# Patient Record
Sex: MALE | Race: WHITE
[De-identification: names, ages, dates, MRNs, and addresses within clinical notes are randomized per-mention and may not be internally consistent; named-entity substitution may affect disease eponyms.]

---

## 2018-01-01 ENCOUNTER — HOSPITAL ENCOUNTER (EMERGENCY)
Dept: HOSPITAL 95 - ER | Age: 38
Discharge: HOME | End: 2018-01-01
Payer: COMMERCIAL

## 2018-01-01 VITALS — BODY MASS INDEX: 36.1 KG/M2 | HEIGHT: 67 IN | WEIGHT: 230.01 LBS

## 2018-01-01 DIAGNOSIS — R07.9: Primary | ICD-10-CM

## 2018-01-01 DIAGNOSIS — Z79.899: ICD-10-CM

## 2018-01-01 DIAGNOSIS — F17.200: ICD-10-CM

## 2018-01-01 LAB
ALBUMIN SERPL BCP-MCNC: 3.6 G/DL (ref 3.4–5)
ALBUMIN/GLOB SERPL: 1.1 {RATIO} (ref 0.8–1.8)
ALT SERPL W P-5'-P-CCNC: 62 U/L (ref 12–78)
ANION GAP SERPL CALCULATED.4IONS-SCNC: 9 MMOL/L (ref 6–16)
AST SERPL W P-5'-P-CCNC: 29 U/L (ref 12–37)
BASOPHILS # BLD AUTO: 0.02 K/MM3 (ref 0–0.23)
BASOPHILS NFR BLD AUTO: 0 % (ref 0–2)
BILIRUB SERPL-MCNC: 0.4 MG/DL (ref 0.1–1)
BUN SERPL-MCNC: 10 MG/DL (ref 8–24)
CALCIUM SERPL-MCNC: 8 MG/DL (ref 8.5–10.1)
CHLORIDE SERPL-SCNC: 107 MMOL/L (ref 98–108)
CO2 SERPL-SCNC: 23 MMOL/L (ref 21–32)
CREAT SERPL-MCNC: 0.95 MG/DL (ref 0.6–1.2)
DEPRECATED RDW RBC AUTO: 40.1 FL (ref 35.1–46.3)
EOSINOPHIL # BLD AUTO: 0.33 K/MM3 (ref 0–0.68)
EOSINOPHIL NFR BLD AUTO: 6 % (ref 0–6)
ERYTHROCYTE [DISTWIDTH] IN BLOOD BY AUTOMATED COUNT: 13.4 % (ref 11.7–14.2)
GLOBULIN SER CALC-MCNC: 3.4 G/DL (ref 2.2–4)
GLUCOSE SERPL-MCNC: 136 MG/DL (ref 70–99)
HCT VFR BLD AUTO: 45.4 % (ref 37–53)
HGB BLD-MCNC: 15.5 G/DL (ref 13.5–17.5)
IMM GRANULOCYTES # BLD AUTO: 0.01 K/MM3 (ref 0–0.1)
IMM GRANULOCYTES NFR BLD AUTO: 0 % (ref 0–1)
LYMPHOCYTES # BLD AUTO: 2.27 K/MM3 (ref 0.84–5.2)
LYMPHOCYTES NFR BLD AUTO: 39 % (ref 21–46)
MCHC RBC AUTO-ENTMCNC: 34.1 G/DL (ref 31.5–36.5)
MCV RBC AUTO: 84 FL (ref 80–100)
MONOCYTES # BLD AUTO: 0.4 K/MM3 (ref 0.16–1.47)
MONOCYTES NFR BLD AUTO: 7 % (ref 4–13)
NEUTROPHILS # BLD AUTO: 2.85 K/MM3 (ref 1.96–9.15)
NEUTROPHILS NFR BLD AUTO: 49 % (ref 41–73)
NRBC # BLD AUTO: 0 K/MM3 (ref 0–0.02)
NRBC BLD AUTO-RTO: 0 /100 WBC (ref 0–0.2)
PLATELET # BLD AUTO: 174 K/MM3 (ref 150–400)
POTASSIUM SERPL-SCNC: 4 MMOL/L (ref 3.5–5.5)
PROT SERPL-MCNC: 7 G/DL (ref 6.4–8.2)
SODIUM SERPL-SCNC: 139 MMOL/L (ref 136–145)
TROPONIN I SERPL-MCNC: <0.015 NG/ML (ref 0–0.04)

## 2018-01-07 ENCOUNTER — HOSPITAL ENCOUNTER (EMERGENCY)
Dept: HOSPITAL 95 - ER | Age: 38
Discharge: HOME | End: 2018-01-07
Payer: COMMERCIAL

## 2018-01-07 VITALS — HEIGHT: 67 IN | WEIGHT: 230.01 LBS | BODY MASS INDEX: 36.1 KG/M2

## 2018-01-07 DIAGNOSIS — K62.5: Primary | ICD-10-CM

## 2018-01-07 DIAGNOSIS — I10: ICD-10-CM

## 2018-01-07 DIAGNOSIS — K76.0: ICD-10-CM

## 2018-01-07 DIAGNOSIS — F17.200: ICD-10-CM

## 2018-01-07 DIAGNOSIS — K57.10: ICD-10-CM

## 2018-01-07 DIAGNOSIS — Z79.899: ICD-10-CM

## 2018-01-07 LAB
ALBUMIN SERPL BCP-MCNC: 3.9 G/DL (ref 3.4–5)
ALBUMIN/GLOB SERPL: 1.2 {RATIO} (ref 0.8–1.8)
ALT SERPL W P-5'-P-CCNC: 59 U/L (ref 12–78)
ANION GAP SERPL CALCULATED.4IONS-SCNC: 5 MMOL/L (ref 6–16)
AST SERPL W P-5'-P-CCNC: 20 U/L (ref 12–37)
BASOPHILS # BLD AUTO: 0.01 K/MM3 (ref 0–0.23)
BASOPHILS NFR BLD AUTO: 0 % (ref 0–2)
BILIRUB SERPL-MCNC: 0.3 MG/DL (ref 0.1–1)
BUN SERPL-MCNC: 16 MG/DL (ref 8–24)
CALCIUM SERPL-MCNC: 8.4 MG/DL (ref 8.5–10.1)
CHLORIDE SERPL-SCNC: 105 MMOL/L (ref 98–108)
CO2 SERPL-SCNC: 28 MMOL/L (ref 21–32)
CREAT SERPL-MCNC: 0.95 MG/DL (ref 0.6–1.2)
DEPRECATED RDW RBC AUTO: 39.8 FL (ref 35.1–46.3)
EOSINOPHIL # BLD AUTO: 0.27 K/MM3 (ref 0–0.68)
EOSINOPHIL NFR BLD AUTO: 5 % (ref 0–6)
ERYTHROCYTE [DISTWIDTH] IN BLOOD BY AUTOMATED COUNT: 13.3 % (ref 11.7–14.2)
GLOBULIN SER CALC-MCNC: 3.2 G/DL (ref 2.2–4)
GLUCOSE SERPL-MCNC: 106 MG/DL (ref 70–99)
HCT VFR BLD AUTO: 47.8 % (ref 37–53)
HGB BLD-MCNC: 16.1 G/DL (ref 13.5–17.5)
IMM GRANULOCYTES # BLD AUTO: 0.01 K/MM3 (ref 0–0.1)
IMM GRANULOCYTES NFR BLD AUTO: 0 % (ref 0–1)
LYMPHOCYTES # BLD AUTO: 1.81 K/MM3 (ref 0.84–5.2)
LYMPHOCYTES NFR BLD AUTO: 36 % (ref 21–46)
MCHC RBC AUTO-ENTMCNC: 33.7 G/DL (ref 31.5–36.5)
MCV RBC AUTO: 83 FL (ref 80–100)
MONOCYTES # BLD AUTO: 0.34 K/MM3 (ref 0.16–1.47)
MONOCYTES NFR BLD AUTO: 7 % (ref 4–13)
NEUTROPHILS # BLD AUTO: 2.64 K/MM3 (ref 1.96–9.15)
NEUTROPHILS NFR BLD AUTO: 52 % (ref 41–73)
NRBC # BLD AUTO: 0 K/MM3 (ref 0–0.02)
NRBC BLD AUTO-RTO: 0 /100 WBC (ref 0–0.2)
PLATELET # BLD AUTO: 185 K/MM3 (ref 150–400)
POTASSIUM SERPL-SCNC: 4.1 MMOL/L (ref 3.5–5.5)
PROT SERPL-MCNC: 7.1 G/DL (ref 6.4–8.2)
SODIUM SERPL-SCNC: 138 MMOL/L (ref 136–145)

## 2018-02-09 ENCOUNTER — HOSPITAL ENCOUNTER (EMERGENCY)
Dept: HOSPITAL 95 - ER | Age: 38
LOS: 1 days | Discharge: HOME | End: 2018-02-10
Payer: COMMERCIAL

## 2018-02-09 VITALS — BODY MASS INDEX: 35.63 KG/M2 | HEIGHT: 67 IN | WEIGHT: 227.01 LBS

## 2018-02-09 DIAGNOSIS — R10.31: ICD-10-CM

## 2018-02-09 DIAGNOSIS — G89.18: Primary | ICD-10-CM

## 2018-02-09 DIAGNOSIS — R11.2: ICD-10-CM

## 2018-02-09 DIAGNOSIS — F17.210: ICD-10-CM

## 2018-02-09 LAB
ALBUMIN SERPL BCP-MCNC: 3.5 G/DL (ref 3.4–5)
ALBUMIN/GLOB SERPL: 1 {RATIO} (ref 0.8–1.8)
ALT SERPL W P-5'-P-CCNC: 53 U/L (ref 12–78)
ANION GAP SERPL CALCULATED.4IONS-SCNC: 8 MMOL/L (ref 6–16)
AST SERPL W P-5'-P-CCNC: 22 U/L (ref 12–37)
BASOPHILS # BLD AUTO: 0.03 K/MM3 (ref 0–0.23)
BASOPHILS NFR BLD AUTO: 0 % (ref 0–2)
BILIRUB SERPL-MCNC: 0.2 MG/DL (ref 0.1–1)
BUN SERPL-MCNC: 10 MG/DL (ref 8–24)
CALCIUM SERPL-MCNC: 8.4 MG/DL (ref 8.5–10.1)
CHLORIDE SERPL-SCNC: 107 MMOL/L (ref 98–108)
CO2 SERPL-SCNC: 26 MMOL/L (ref 21–32)
CREAT SERPL-MCNC: 0.96 MG/DL (ref 0.6–1.2)
DEPRECATED RDW RBC AUTO: 37.6 FL (ref 35.1–46.3)
EOSINOPHIL # BLD AUTO: 0.19 K/MM3 (ref 0–0.68)
EOSINOPHIL NFR BLD AUTO: 2 % (ref 0–6)
ERYTHROCYTE [DISTWIDTH] IN BLOOD BY AUTOMATED COUNT: 12.6 % (ref 11.7–14.2)
GLOBULIN SER CALC-MCNC: 3.6 G/DL (ref 2.2–4)
GLUCOSE SERPL-MCNC: 106 MG/DL (ref 70–99)
HCT VFR BLD AUTO: 43 % (ref 37–53)
HGB BLD-MCNC: 14.4 G/DL (ref 13.5–17.5)
IMM GRANULOCYTES # BLD AUTO: 0.03 K/MM3 (ref 0–0.1)
IMM GRANULOCYTES NFR BLD AUTO: 0 % (ref 0–1)
LYMPHOCYTES # BLD AUTO: 2.25 K/MM3 (ref 0.84–5.2)
LYMPHOCYTES NFR BLD AUTO: 25 % (ref 21–46)
MCHC RBC AUTO-ENTMCNC: 33.5 G/DL (ref 31.5–36.5)
MCV RBC AUTO: 82 FL (ref 80–100)
MONOCYTES # BLD AUTO: 0.49 K/MM3 (ref 0.16–1.47)
MONOCYTES NFR BLD AUTO: 6 % (ref 4–13)
NEUTROPHILS # BLD AUTO: 5.99 K/MM3 (ref 1.96–9.15)
NEUTROPHILS NFR BLD AUTO: 67 % (ref 41–73)
NRBC # BLD AUTO: 0 K/MM3 (ref 0–0.02)
NRBC BLD AUTO-RTO: 0 /100 WBC (ref 0–0.2)
PLATELET # BLD AUTO: 210 K/MM3 (ref 150–400)
POTASSIUM SERPL-SCNC: 3.5 MMOL/L (ref 3.5–5.5)
PROT SERPL-MCNC: 7.1 G/DL (ref 6.4–8.2)
SODIUM SERPL-SCNC: 141 MMOL/L (ref 136–145)

## 2018-02-27 ENCOUNTER — HOSPITAL ENCOUNTER (EMERGENCY)
Dept: HOSPITAL 95 - ER | Age: 38
Discharge: HOME | End: 2018-02-27
Payer: COMMERCIAL

## 2018-02-27 VITALS — HEIGHT: 67 IN | WEIGHT: 226 LBS | BODY MASS INDEX: 35.47 KG/M2

## 2018-02-27 DIAGNOSIS — R10.31: Primary | ICD-10-CM

## 2018-02-27 DIAGNOSIS — F17.210: ICD-10-CM

## 2018-02-27 DIAGNOSIS — K92.1: ICD-10-CM

## 2018-02-27 DIAGNOSIS — Z88.8: ICD-10-CM

## 2018-02-27 LAB
ALBUMIN SERPL BCP-MCNC: 3.8 G/DL (ref 3.4–5)
ALBUMIN/GLOB SERPL: 1.1 {RATIO} (ref 0.8–1.8)
ALT SERPL W P-5'-P-CCNC: 36 U/L (ref 12–78)
ANION GAP SERPL CALCULATED.4IONS-SCNC: 7 MMOL/L (ref 6–16)
AST SERPL W P-5'-P-CCNC: 23 U/L (ref 12–37)
BASOPHILS # BLD AUTO: 0.02 K/MM3 (ref 0–0.23)
BASOPHILS NFR BLD AUTO: 0 % (ref 0–2)
BILIRUB SERPL-MCNC: 0.5 MG/DL (ref 0.1–1)
BUN SERPL-MCNC: 12 MG/DL (ref 8–24)
CALCIUM SERPL-MCNC: 8.2 MG/DL (ref 8.5–10.1)
CHLORIDE SERPL-SCNC: 107 MMOL/L (ref 98–108)
CO2 SERPL-SCNC: 26 MMOL/L (ref 21–32)
CREAT SERPL-MCNC: 0.83 MG/DL (ref 0.6–1.2)
DEPRECATED RDW RBC AUTO: 43.2 FL (ref 35.1–46.3)
EOSINOPHIL # BLD AUTO: 0.22 K/MM3 (ref 0–0.68)
EOSINOPHIL NFR BLD AUTO: 4 % (ref 0–6)
ERYTHROCYTE [DISTWIDTH] IN BLOOD BY AUTOMATED COUNT: 14.2 % (ref 11.7–14.2)
GLOBULIN SER CALC-MCNC: 3.5 G/DL (ref 2.2–4)
GLUCOSE SERPL-MCNC: 115 MG/DL (ref 70–99)
HCT VFR BLD AUTO: 45.2 % (ref 37–53)
HGB BLD-MCNC: 14.7 G/DL (ref 13.5–17.5)
IMM GRANULOCYTES # BLD AUTO: 0.02 K/MM3 (ref 0–0.1)
IMM GRANULOCYTES NFR BLD AUTO: 0 % (ref 0–1)
LYMPHOCYTES # BLD AUTO: 1.59 K/MM3 (ref 0.84–5.2)
LYMPHOCYTES NFR BLD AUTO: 26 % (ref 21–46)
MCHC RBC AUTO-ENTMCNC: 32.5 G/DL (ref 31.5–36.5)
MCV RBC AUTO: 84 FL (ref 80–100)
MONOCYTES # BLD AUTO: 0.45 K/MM3 (ref 0.16–1.47)
MONOCYTES NFR BLD AUTO: 7 % (ref 4–13)
NEUTROPHILS # BLD AUTO: 3.86 K/MM3 (ref 1.96–9.15)
NEUTROPHILS NFR BLD AUTO: 63 % (ref 41–73)
NRBC # BLD AUTO: 0 K/MM3 (ref 0–0.02)
NRBC BLD AUTO-RTO: 0 /100 WBC (ref 0–0.2)
PLATELET # BLD AUTO: 170 K/MM3 (ref 150–400)
POTASSIUM SERPL-SCNC: 3.8 MMOL/L (ref 3.5–5.5)
PROT SERPL-MCNC: 7.3 G/DL (ref 6.4–8.2)
SODIUM SERPL-SCNC: 140 MMOL/L (ref 136–145)

## 2018-09-23 ENCOUNTER — HOSPITAL ENCOUNTER (EMERGENCY)
Dept: HOSPITAL 95 - ER | Age: 38
Discharge: HOME | End: 2018-09-23
Payer: COMMERCIAL

## 2018-09-23 VITALS — HEIGHT: 67 IN | WEIGHT: 170 LBS | BODY MASS INDEX: 26.68 KG/M2

## 2018-09-23 DIAGNOSIS — S00.93XA: ICD-10-CM
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
## 2018-09-23 DIAGNOSIS — Z88.8: ICD-10-CM

## 2018-09-23 DIAGNOSIS — S00.432A: Primary | ICD-10-CM

## 2018-09-23 DIAGNOSIS — F17.210: ICD-10-CM

## 2018-09-23 DIAGNOSIS — I10: ICD-10-CM

## 2019-02-12 ENCOUNTER — HOSPITAL ENCOUNTER (EMERGENCY)
Dept: HOSPITAL 94 - ER | Age: 39
Discharge: HOME | End: 2019-02-12
Payer: MEDICAID

## 2019-02-12 VITALS — WEIGHT: 146.28 LBS | HEIGHT: 67 IN | BODY MASS INDEX: 22.96 KG/M2

## 2019-02-12 VITALS — SYSTOLIC BLOOD PRESSURE: 141 MMHG | DIASTOLIC BLOOD PRESSURE: 83 MMHG

## 2019-02-12 DIAGNOSIS — K62.5: Primary | ICD-10-CM

## 2019-02-12 DIAGNOSIS — R10.33: ICD-10-CM

## 2019-02-12 LAB
ALBUMIN SERPL BCP-MCNC: 3.5 G/DL (ref 3.4–5)
ALBUMIN/GLOB SERPL: 0.9 {RATIO} (ref 1.1–1.5)
ALP SERPL-CCNC: 88 IU/L (ref 46–116)
ALT SERPL W P-5'-P-CCNC: 31 U/L (ref 12–78)
ANION GAP SERPL CALCULATED.3IONS-SCNC: 10 MMOL/L (ref 8–16)
AST SERPL W P-5'-P-CCNC: 21 U/L (ref 10–37)
BASOPHILS # BLD AUTO: 0.1 X10'3 (ref 0–0.2)
BASOPHILS NFR BLD AUTO: 1.3 % (ref 0–1)
BILIRUB SERPL-MCNC: 0.8 MG/DL (ref 0.1–1)
BUN SERPL-MCNC: 18 MG/DL (ref 7–18)
BUN/CREAT SERPL: 14 (ref 5.4–32)
CALCIUM SERPL-MCNC: 9.4 MG/DL (ref 8.5–10.1)
CHLORIDE SERPL-SCNC: 103 MMOL/L (ref 99–107)
CO2 SERPL-SCNC: 26.8 MMOL/L (ref 24–32)
CREAT SERPL-MCNC: 1.29 MG/DL (ref 0.6–1.1)
EOSINOPHIL # BLD AUTO: 0.2 X10'3 (ref 0–0.9)
EOSINOPHIL NFR BLD AUTO: 2.8 % (ref 0–6)
ERYTHROCYTE [DISTWIDTH] IN BLOOD BY AUTOMATED COUNT: 13.5 % (ref 11.5–14.5)
GFR SERPL CREATININE-BSD FRML MDRD: 62 ML/MIN
GLUCOSE SERPL-MCNC: 111 MG/DL (ref 70–104)
HCT VFR BLD AUTO: 48.6 % (ref 42–52)
HGB BLD-MCNC: 16.3 G/DL (ref 14–17.9)
INR PPP: 1 INR
LYMPHOCYTES # BLD AUTO: 1.1 X10'3 (ref 1.1–4.8)
LYMPHOCYTES NFR BLD AUTO: 14.4 % (ref 21–51)
MCH RBC QN AUTO: 27.3 PG (ref 27–31)
MCHC RBC AUTO-ENTMCNC: 33.5 G/DL (ref 33–36.5)
MCV RBC AUTO: 81.4 FL (ref 78–98)
MONOCYTES # BLD AUTO: 0.4 X10'3 (ref 0–0.9)
MONOCYTES NFR BLD AUTO: 5.7 % (ref 2–12)
NEUTROPHILS # BLD AUTO: 5.8 X10'3 (ref 1.8–7.7)
NEUTROPHILS NFR BLD AUTO: 75.8 % (ref 42–75)
PLATELET # BLD AUTO: 190 X10'3 (ref 140–440)
PMV BLD AUTO: 9.3 FL (ref 7.4–10.4)
POTASSIUM SERPL-SCNC: 4.5 MMOL/L (ref 3.5–5.1)
PROT SERPL-MCNC: 7.3 G/DL (ref 6.4–8.2)
PROTHROMBIN TIME: 9.8 SECONDS (ref 9–12)
RBC # BLD AUTO: 5.97 X10'6 (ref 4.7–6.1)
SODIUM SERPL-SCNC: 140 MMOL/L (ref 135–145)
WBC # BLD AUTO: 7.6 X10'3 (ref 4.5–11)

## 2019-02-12 PROCEDURE — 36415 COLL VENOUS BLD VENIPUNCTURE: CPT

## 2019-02-12 PROCEDURE — 80053 COMPREHEN METABOLIC PANEL: CPT

## 2019-02-12 PROCEDURE — 99284 EMERGENCY DEPT VISIT MOD MDM: CPT

## 2019-02-12 PROCEDURE — 85610 PROTHROMBIN TIME: CPT

## 2019-02-12 PROCEDURE — 85025 COMPLETE CBC W/AUTO DIFF WBC: CPT

## 2019-06-10 NOTE — NUR
NOTE
 
PT RESTING QUIETLY. ST. RATE 105-118 BPM. CONTINIOUS BIOX 94% OR HIGHER. PT
GIVEN ORAL BP MEDICATIONS PER DR LAKHANI DIRECTION. PT HAS BEEN ORIENTED BUT
CIWA 8-14. ACCORDING TO THE PT HIS WITHDRAWELS AEN'T DISORIENTION BUT
EXTREME RAGE AND PHYSICAL VIOLENCE. PT ASKED FOR A LOARGER ATIVAN DOSE. CALLED
DR DAMICO WHO GAVE ORDER FOR LARGER DOSE. ATIVAN 2MG GIVEN. PT SLEEPING.
EASILY AROUSABLE. BED ALARM ON. CONTINUE POT.

## 2019-06-10 NOTE — NUR
Shift Summary
No acute changes this shift, pt remains hypertensive, sleeping througout
this shift. NO events on tele. Pt in no apparent sign of distress.
Breathing easy and unlabored. Pt remains with wheezes throughout. Pt on
RA with o2 saturations >90%. Pt does not use call light, needs
redirection frequently, is pleasant. Pt uses urinal at bedside
independantly. Pt is able to make needs known, denies chest pain or
pressure, denies SOB. Bed in lowest and locked position, remains with
bed alarm in place for pt safety and reduction of fall risk. Will
continue to monitor.

## 2019-06-10 NOTE — NUR
Assumed care of pt at approx 2000. Report recieved from NELSY White. Pt
transferred from ED via [facility] transported with RN at bedside. Pt hypertensive
and tachycardic at time of arrival. Pt denies chest pain, chest pressure, or
SOB. Pt lethargic and falling in and out of sleep frequently. Pt oriented x4,
states generalized pain but cannot specify location or other qualities, pt
falls asleep before sentences are finished. Pt awakens to sound. Pt with
impulsive and sporatic movements. Educated pt on plan of care for this night,
oriented to room, educated on fall prevention. Bed alarm in place, pt calls
appropriately, uses urinal for voiding. Pt has been sleeping soundly since
arrival to unit. See admission assessment for detailed assessment. No events
on tele this shift.
 
MD notified of hypertension, orders recieved and will medicate per orders.
Will continue to monitor.

## 2019-06-10 NOTE — NUR
FAMILY MEMBER UPDATE
VERBAL PERMISSION GIVEN TO UPDATE MOTHER VIA PHONE. WILL CONTACT MOTHER AND
PROVIDE UPDATE.

## 2019-06-11 NOTE — NUR
SHIFT SUMMARY.
 
PT HAS BEEN PLEASENT AND COOPERATIVE ALL SHIFT UNTIL APROX 1600 WHEN PT'S AUNT
CAME IN TO VIST. AT THIS TIME THE PT BECAME VERY AGRESSIVE AND ANGRY TOWARDS
STAFF AND STATED THAT HE WANTED TO GO HOME, EDUCATION WAS PROVIDED TO PT ABOUT
HIS CURRENT CONDITION AND THE RISK VS. BENEFITS OF LEAVING AMA. PT STILL
WANTED TO LEAVE. PROVIDER WAS CALLED.  AMA PAPER WORK WAS FILLED OUT, IV WAS
TAKEN OUT AND PT DECIDED TO STAY. PROVIDER WAS CALLED AGAIN AND NOTIFIED THAT
THE PT WAS GOING TO STAY. PT HAS BEEN VERY ANGRY AND AGGRESSIVE TWARDS STAFF.
 
PT HAS BEEN  MEDICATED PER EMAR FOR CWIA'S OF 9.
 
CALL LIGHT IN REACH, BED IS LOCKED AND LOW WILL CONTINUE TO MONITOR UNTIL
REPORT IS GIVEN TO ONCOMING RN.

## 2019-06-11 NOTE — NUR
SHIFT SUMMARY
PT HAS REMAINED AOX4 THROUGHOUT SHIFT. VSS. COOPERATIVE WITH CARE. PT HAS
RESTED WELL THROUGHOUT THE NIGHT, WAKING EASILY FOR CARE. CIWA HAS RANGED FROM
8-13 THROUGHOUT THE NIGHT AND PATIENT REQUESTING ATIVAN APPROXIMATELY Q2 FOR
RELIEF OF WITHDRAWAL SYMPTOMS. O2 SATS HAVE REMAINED >90% ON RA. PT WITH ONE
EPISODE OF DYSPNEA THIS AM THAT DISSIPATED WITH BREATHING TREATMENT, SATS
REMAINED @ 94-96% THROUGHOUT EPISODE. PT CONTINUES TO HAVE SPORATIC AND
IMPULSIVE MOVEMENTS, CONTINUES USE URINAL AT BEDSIDE WITHOUT DIFFICULTY.  NO
OTHER CHANGES NOTED FROM INITIAL ASSESSMENT. WILL CONTINUE TO MONITOR AND
REPORT TO ONCOMING SHIFT RN. BED IN LOW POSITION, CALL LIGHT IN REACH. BED
ALARM SET FOR SAFETY.

## 2019-06-11 NOTE — NUR
AM NOTE...
 
ASSUMED CARE OF PT APROX 0700, PT IS A&Ox4 AND CURRENTLY ADMITTED FOR
PULMONARY EDEMA, PT IS ALSO BEING TREATED FOR ETOH W/D. PT IS SLEEPING WELL
BUT WAKES TO VERBAL STIMULI.
 
TELE INTACT, ST , PT'S /109, NO EDEMA NOTED ON ASSESSMENT. L/S DIM
T/O AND CRACKLES NOTED IN THE BASES, PT IS  ON RA W/RR AT 18, EVEN AND
UNLABORED. BT PRESENT AND HYPOACTIVE, ABD IS SLIGHTLY FIRM AND NONTENDER TO
PALP. PT STATES THAT HE IS HAVING PAIN FROM AN INGUINAL HERNIA. PT IS BEING
TREATED PER CWIA PROTOCOL.
 
CALL LIGHT IN REACH, BED ALARM IS ON WILL CONTINUE TO MONITOR.

## 2019-06-12 NOTE — NUR
SHIFT SUMMARY
PT HAS REMAINED AOX4 THROGHOUT SHIFT. VSS. PLEASANT AND COOPERATIVE WITH CARE.
PT REQUESTING SHOWER AT START OF SHIFT AND LINEN CHANGE, REPORTS FEELING MUCH
BETTER AFTER SHOWER. O2 SATS HAVE REMAINED >90% ON RA THROUGHOUT THE NIGHT. PT
REPORTING SOME DYSPNEA ON EXERTION WHEN WALKING FROM BATHROOM TO BED- O2 SATS
OF 99% ON AMBULATION WHICH IS INCREASE FROM WHEN AT REST. PT ENCOURAGED TO
INCREASE ACTIVITY AS TOLERATED. PT MEDICATED MULTIPLE TIMES FOR CIWA OF 7-8.
PT WITHDRAWAL APPEARS MUCH IMPROVED FROM PREVIOUS NIGHT, SPEECH WITH
IMPROVED CLARITY, BUT STILL OCCASIONALLY SLURRED. PT POLITE WITH REQUESTS,
SAYING PLEASE AND THANK YOU WHEN CARE PROVIDED. HAS RESTED WELL THROUGHOUT
MUCH OF THE NIGHT, USING URINAL AT BEDSIDE. NO OTHER CHANGES FROM INITIAL
ASSESSMENT. WILL CONTINUE TO MONITOR AND REPORT TO ONCOMING SHIFT RN. BED IN
LOW POSITION, CALL LIGHT IN REACH. BED ALARM SET FOR SAFETY.

## 2019-06-12 NOTE — NUR
DISCHARGE NOTE
PT STABLE FOR DISCHARGE. IV REMOVED. DISCHARGE INSTRUCTIONS AND DISCHARGE
MEDICATIONS REVIEWED WITH PT. PT VERBALIZES UNDERSTANDING AND DENIES
QUESTIONS. PT DISCHARGE VIA WHEELCHAIR.

## 2020-10-18 NOTE — NUR
PT SUMMARY:
PT KEEPS C/O ABD PAIN PT WAS GIVEN IV FENTANYL Q4HRS, OFFERED WARM COMPRESS
DOESNT SEEM TO HELP, WAS ALSO GIVEN SIMETHICONE TABS FOR GAS PAIN SINCE PT HAS
BEEN PASSING FLATUS, WAS ALSO STARTED ON BUMEX 1MG, PT SEEMED TO CALM DOWN AT
THIS TIME, PT IN BED RESTING. VITALS STABLE HRR SINUS 90'S, BP SYSTOLIC 130'S,
SATS ABOVE 95% ON RA, AFEBRILE. DENIES CHEST PAIN/PRESSURE. NO ACUTE CHANGE,
TROPONIN TRENDING DOWN, PENDING ECHO RESULT. ABLE TO MAKE NEEDS KNOWN, WILL
MONITOR

## 2020-10-18 NOTE — NUR
I&O'S
PATIENT STATING HE DOES NOT FEEL THAT THE BUMEX IS HELPING, HE IS STATING THAT
THE LASIX DID NOT WORK EARLIER EITHER. PATIENT CONTINUES TO FREQUENTLY REQUEST
LOTS OF PO FLUIDS TO DRINK, EVEN AFTER EDUCATION PROVIDED ON THE NEED TO
DECREASE FLUID INTAKE. PATIENT REFUSING TO KEEP TRACK OF HIS VOIDS, EVEN AFTER
EDUCTION PROVIDED MULTIPLE TIMES ON THE REASON FOR THE NEED TO KEEP TRACK OF
I&O'S. PATIENT CONTINUES TO REFUSE TO USE THE URINAL, HE GOES INTO THE
BATHROOM, SHUT THE DOOR AND FLUSH BEFORE STAFF ABLE TO ASSESS OUTPUT. PATIENT
VERY ARGUMENTATIVE AND NOT RECEPTIVE TO EDUCATION. PATIENT VERY RESISTANT TO
EDUCATION AND PATIENT CARE AT THIS TIME.

## 2020-10-18 NOTE — NUR
ASSUME CARE:
PT ARRIVED IN THE UNIT VIA STRETCHER WAS ABLE TO TRANSFER INDEPENDENTLY INTO
HOSPITAL BED. PT ALERT AND ORIENTED X4 IS HERE FOR ACUTE ON CHRONIC CHF.
VITALS HRR SINUS TACH 110'S, BP SYSTOLIC 130'S, SATS ABOVE 95% ON RA,
AFEBRILE. PT HAS SOME NOTICEABLE SOB WITH EXERTION, PT ON LASIX 40MG BID
SWITCHED TO BUMEX PER DR ESTRADA, PT AHD ABOUT 1L URINE OUT THIS MORNING.
AWAITING FOR ECHO RESULT. PT IN BED RESTING TAKES FENTANYL 25MCG FOR ABD PAIN.
ABLE TO MAKE NEEDS KNOWN. WILL MONITOR

## 2020-10-19 NOTE — NUR
RETURNED FROM CATH LAB. TR BAND TO RIGHT RADIAL WRIST. CAP REFILL <3 SEC,
RADIAL PULSE [place], MOVES ALL FIVE RIGHT FINGERS WITHOUT DIFFICULTY.

## 2020-10-19 NOTE — NUR
2ML AIR REMOVED FROM TR BAND TO RIGHT WRIST, NO VISIBLE BLEEDING, RADIAL PULSE
PALPABLE, MOVES ALL FIVE FINGERS WITHOUT DIFFICULTY.

## 2020-10-19 NOTE — NUR
SHIFT SUMMARY;
 
A/A/OX4 THROUGHOUT SHIFT. VSS, TAKEN TO CATH LAB FOR ANGIO. RIGHT RADIAL SITE.
TR BAND AND ARM BOARD IN PLACE. NO ACTIVE BLEEDING. 02 USED AT 2L FOR COMFORT
DURING SHIFT, SATS REMAINED ABOVE 94% BUT PT REPORTS IT MAKING HIM FEEL
BETTER. WILL CONTINUE TO TREAT AND MONITOR UNTIL CHANGE OF SHIFT.

## 2020-10-19 NOTE — NUR
SHIFT SUMMARY
PT ALERT AND ORIENTED. PT REPORTS PAIN T/O SHIFT. PAIN MEDICATIONS PER EMAR
PROVIDED. PT INDEPENDENT WITHIN THE ROOM. PT AGGITATED AT TIMES. CPAP PROVIDED
BY RESPIRATORY CARE. PT REFUSED. NASAL CANULA WORN AT 2 L OF OXYGEN. OXYGEN
SATURATION MAINTAINED OVER 92%. PT REPORTS NO CP OR PRESSURE. HR STABLE. BP
STABLE. WILL CONTINUE TO MONITOR UNTIL REPORT GIVEN TO DAYSHIFT RN.

## 2020-10-19 NOTE — NUR
12ML AIR REMOVED TOTAL FROM TR BAND. NO BLEEDING AT THIS TIME, RADIAL PULSE
PALPABLE, CAP REFILL <3. WILL LEAVE BAND IN PLACE FOR ONE HOUR PER ORDERS AND
CONTINUE TO MONITOR.

## 2020-10-19 NOTE — NUR
1945 PT SITTING UP HIGH FOWLERS POSITION WHILE WEARING O2 AT 1L/M PER NASAL
CANNULA; PT TALKING CALM, ORGANIZED THOUGHT PROCESS MANNER; PT RIGHT WRIST
TEGADERM INTACT OVER PUNCTURE SITE THAT IS NON BLEEDING WITH ARM BOARD INTACT;
PT ADVISED WHEN NEXT DOSAGE OF FENTANYL IS DUE AT 2130 WITH ACKNOWLEDGEMENT
NOTED.

## 2020-10-19 NOTE — NUR
SHIFT SUMMARY
 
A/A/OX4 THROUGHOUT SHIFT TODAY. VSS, 2L 02 VIA NC, FOOT AND LLE WOUND KERLEX
AND NON ADHERANT. EVALUATED TODAY BY INFECTIOUS DISEASE, NPO AFTER MIDNIGHT
FOR BEHZAD IN AM. 1 PERSON ASSIST WITH AMBULATION, REZA REMAINS IN PLACE
DRAINING CLEAR YELLOW URINE. LEFT UPPER ARM DVT FROM PREVIOUS PICC REMAINS
SLIGHTLY SWOLLEN, NO REDNESS OR WARMTH TO AREA. WILL CONTINUE TO MONITOR AND
TREAT UNTIL CHANGE OF SHIFT.

## 2020-10-20 NOTE — NUR
AMA DISCHARGE
PT REQUESTING IV PAIN MEDICATIONS, PLANS FOR DISCHARGE TODAY; CLARIFIED ORDERS
WITH DR ESTRADA; NEW ORDERS TO D/C FENTANYL AND TO ADMINISTER TYLENOL. PT
NOTIFIED IV PAIN MEDICATIONS NO LONGER AVAILABLE AND TYLENOL IS AVAIALBE; PT
BECAME AGGITATED AND STARTED YELLING THAT THE OTHER MEDS SHOULD BE AVAILABLE;
WHEN THIS RN COMFIRMED THAT THEY WERE NO LONGER AVAIALBLE PT STATES THAT HE
WANTS TO LEAVE. "IM NOT WAITING FOR THE DOCTOR; JUST DISCHARGE ME." PT
NOTIFIED THAT WE WOULD NEED TO WAIT FOR THE DR TO ROUND AND FOR DISCAHRGE
ORDERS, PT STATES "IM NOT WAITING." PT EDUCATED ON RISKS OF LEAVING AMA
INCLUDE DEATH, HEART ATTACK OR STROKE. PT CONTINUES TO NOT WANT TO WAIT FOR
DR. DR ESTRADA NOTICHRISTINA. PLANS TO ORDER MEDICATIONS FOR HOME; PT NOTIIFED AND
REQUESTS [facility]. PT LEFT ROOM VIA WHEELCHAIR AT 1018 AMA.

## 2020-10-20 NOTE — NUR
SHIFT SUMMARY:
39 Y/O OBESE MALE HAD VERY RESTLESS NIGHT ALL SHIFT WITH NUMEROUS BEHAVIOR
ISSUES TO INCLUDE PT YELLING AND CUSSING AT STAFF; THIS NURSE ENDED UP BEING
PRIMARY PERSON TO ENTER ROOM FOR ALL CARE THIS SHIFT AS PATIENT SEEMED TO
RESPOND BETTER WITH JUST SINGLE POINT OF CONTACT FOR INTERACTIONS; PT RIGHT
WRIST PUNCTURE SITE WELL APPROXIMATED AND COVERED WITH OPSITE AND WRIST SPLINT
INTACT; PT C/O PAIN RIGHT WRIST RATED 8/10 WITH FENTANYL 25MCG IVP GIVEN Q4H
AND TYLENOL 650MG PO GIVEN TWICE WITH ADEQUATE RELIEF NOTED; SEE PREVIOUS
NURSING NOTES THIS SHIFT; PT POSSIBLE DISCHARGE HOME TODAY; TELEMETRY REFLECTS
NSR WITH HEART RATE 92 PER LINA--TELE TECH; BED LOW POSITION WITH CALL
LIGHT AT SIDE.

## 2020-11-06 NOTE — NUR
PT AGITATED, RESTLESS IN BED. EXPLAINED PLAN OF CARE. C/O THROAT DISCOMFORT,
STATES FEELS "SOMETHING IS GROWING IN THERE". TONSILS SWOLLEN, PRODUCTIVE
COUGH. RX WITH ATIVAN.

## 2020-11-06 NOTE — NUR
PT SET OFF BED ALARM
STATED WANTED TO GO FOR WALK BUT WAS VERY GROGGY AND UNSTEADY ON FEET.
ASSISTED BACK TO BED.  NOW RESTING ON SIDE W/EYES CLOSED.  BED ALARM ON.

## 2020-11-06 NOTE — NUR
PT FALL
HEARD PT VOMITING, ENTERED ROOM TO SEE PT HAD VOMITED ACROSS FLOOR.  S.O. WAS
AT BEDSIDE.  AS RETRIEVED TOWELS TO CLEAN UP VOMIT, SAW MOVEMENT OUT OF CORNER
OF EYE AND HEARD THUD.  QUICKLY ENTERED ROOM TO FIND PT TRYING TO SIT UP ON
FLOOR,
TANGLED IN BLANKETS.  NOTED PT HAD BLOODY NOSE AND SWELLING AND BRUISING WITH
SLIGHT BLEEDING
TO
CENTER OF BROWLINE. ICE APPLIED TO FOREHEAD
APPROXIMATELY THREE INCHES WIDE BY ONE INCH TALL.
ASSISTED TO BED, OBTAINED VS AND CALLED RAPID RESPONSE.  PT DROWSY BUT ANSWERS
QUESTIONS APPROPRIATELY.  SPOKE TO [facility]ICK[facility] GILMORE, ORDERS OBTAINED FOR STAT
HEAD CT.  CT COMPLETED AND PT BACK TO ROOM.  PT SLEEPING BUT AROUSABLE.  ICE
APPLIED TO NECK FOR COMFORT.  BED ALARM ON.  CALL LIGHT IN REACH.

## 2020-11-06 NOTE — NUR
PT BACK TO ROOM FROM [facility] SCAN
AWAKE AND ALERT.  WENT IN BATHROOM, VOIDED AND WAS DRINKING FROM SINK.
OBTAINED ORDERS FOR CLEAR LIQUIDS.  PROVIDED APPLE JUICE AND OSMAN MIST PER
PT REQUEST.  PT HAS PRODUCTIVE COUGH.  VISITOR AT BEDSIDE.

## 2020-11-06 NOTE — NUR
PT COMPLAINING OF ACUTE PAIN WHICH HE RATES 8/10. NO DIMINISHED LUNG SOUNDS,
PT STATES ACUTELY TENDER TO LIGHT TOUCH. NO CREPITUS NOTED. ON CALL PHYSICIAN
NOTIFIED. ORDER FOR CHEST X-RAY OBTAINED.

## 2020-11-06 NOTE — NUR
PT CONTINUES TO BE AGITATED, MOVING RESTLESSLY IN BED, SITTING UP, C/O NOT
BEING ABLE TO EAT, DRINK. GIVEN ICE, EXPLAINED PLAN OF CARE. UPDATE TO DR. FERNANDEZ. RX WITH ADDITIONAL DOSE ATIVAN WITH IMPROVEMENT, RESTING NOW. VS
STABLE.  CONTINUOUS SATURATIONS MONITOR ON

## 2020-11-06 NOTE — NUR
PT ARRIVED TO UNIT FROM PCU.
SLEEPING OFF AND ON.  WAKES UP BRIEFLY AND THEN RETURNS TO SLEEP.  PROVIDED
ICE CHIPS PER PT REQUEST.  BED ALARM ON.  TELE SINUS TACH .

## 2020-11-06 NOTE — NUR
ADMIT NOTE/SHIFT SUMMARY
PATIENT ADMITTED EARLIER THIS SHIFT. PATIENT VERY FIDGETY UPON ADMIT. PATIENT
SETTLED IN AND ORIENTED TO THE ROOM, UNIT, AND CALL LIGHT. PATIENT CONTINUED
TO BE VERY FIDGETY AND PICKED AT THINGS FREQUENTLY THROUGHOUT THE NIGHT.
PATIENT TOSSED AND TURNED FREQUENTLY IN THE BED AND WAS VERY AGITATED WHEN
AWAKE. PATIENT DROWSY AT TIMES AND WOULD FALL ASLEEP IN THE MIDDLE OF A
CONVERSATION, HOWEVER, PATIENT EASILY AWAKENS TO VERBAL STIMULI.
PATIENT VERY IMPULSIVE WHEN AWAKE. BED ALARM ON FOR SAFETY. PATIENT MEDICATED
FOR AGITATION THROUGHOUT THE NIGHT AS CHARTED. VITAL SIGNS AS CHARTED. REPORT
GIVEN TO ONCOMING RN.

## 2020-11-06 NOTE — NUR
PT HAD EMESIS IN GARBAGE CAN.
STATED "IT JUST CAME UP".  MEDICATED PER ORDERS FOR NAUSEA AND 10/10 PAIN.
NOW SLEEPING W/S.O. IN BED.

## 2020-11-06 NOTE — NUR
PT CALLED FOR MORE FLUIDS.
ADVISED TO SLOW DOWN SO DOES NOT CAUSE ABDOMINAL DISCOMFORT/NAUSEA.  PT ASKED
WHEN HIS DINNER TRAY WOULD ARRIVE, STATING HE WAS "STARVING" BECAUSE HE HADN'T
EATEN ALL DAY.  SITTING ON EDGE OF BED USING PHONE.

## 2020-11-06 NOTE — NUR
ASSESSMENT-
PT ASLEEP, AWAKENS EASILY TO NAME, COOPERATIVE, ABLE TO ANSWER QUESTIONS.
COUGH PRODUCTIVE OF THICK WHITE SECRETIONS. LUNGS CLEAR, DENIES SOB.
REPOSITIONING SELF IN BED.  HAD C/O PAIN EARLIER-RX WITH FENTANYL
WITH IMPROVEMENT-ASLEEP WHEN UNDISTURBED. NO C/O ANXIETY. DR. SILVA HERE-SEE
ORDERS. PIV INTACT. BED ALARM ON. TAKING SCANT ICE CHIPS PER DR. SILVA

## 2020-11-07 NOTE — NUR
SHIFT SUMMARY:
ZAID AROUSES TO TOUCH. HE HAS SLURRED SPEECH AND BECOMES AGITATED WITH
QUESTIONS. HE DOES NOT REMEMBER HIS NPO STATUS OR WHY THE DOCTOR HAS ORDERED
IT. HE CONTACTED A FRIEND AND ASKED THEM TO BRING HIM FOOD, TELLING THEM THAT
HE HAD ALREADY UNDERGONE SURGERY. HE HAS ATTEMPTED TO DRINK FROM THE SINK
WHILE USING THE BATHROOM. HE DID EXPERIENCE A FALL YESTERDAY FOR WHICH A HEAD
CT AND CHEST X-RAY WERE OBTAINED. HE HAS NOT VOMITED THIS SHIFT. HE HAS
RECIEVED ATIVAN FOR AGITATION AND FENTANYL FOR PAIN. HE USES HIS CALL LIGHT
OCCASSIONALLY. TELE IN PLACE. HE IS LYING IN BED WITH HIS CALL LIGHT IN REACH.
WILL REPORT TO DAY SHIFT RN.

## 2020-11-07 NOTE — NUR
DISCHARGE SUMMARY
PT A&OX4, WALKED OFF FLOOR, DECLINED WC OUT, REVIEWED DISCHARGED INSTRUCTIONS
W/PT, EVEN THOUGHT PT DECLINED TO PAY ATTENTION, SHOWED HIM HIS MEDICATION
LIST, ADVISED HIM THAT ZOFRAN WAS FAXED TO [facility] AND SCRIPT FOR PAIN MED IN
FOLDER THAT NEEDS TO BE TAKEN TO A PHARMACY TO BE FILLED.  PT DECLINED TO CALL
FOR A RIDE.  I ENCOURAGED PT TO CALL HIS AUNT, GIRLFRIEND, ANYONE ELSE  - PT
DECLINED TO CALL ANYONE FOR A RIDE - PT DECLINED. IV DC'D. BEFORE PT LEFT THE
ROOM HE THREW ALL HIS DRINKS, CLOTHES, TELE BOX ON THE FLOOR - GENERALLY
TRASHED THE ROOM, CALLED ME A COUPLE OF NAMES AND WALKED OFF THE FLOOR.

## 2020-11-07 NOTE — NUR
AGGITATION
AT APROX 0740 PT BEGAN LOUDLY YELLING OUT FROM ROOM. THIS RN ALONG WITH
PRIMARY RN TO ROOM. PT YELLING/CUSSING AT STAFF, NURSING SUPERVISOR CALLED
SECURITY WHO ARRIVED BUT WERE NOT NEEDED TO ASSIST IN ROOM AT THAT TIME. PT
STATES HE IS "TIRED OF ALL OF THIS" PT MEDICATED WITH 1MG ATIVAN IVP. PT
EDUCATED ON OUR ZERO TOLERANCE FOR VIOLENCE POLICY.

## 2020-11-13 NOTE — NUR
SHIFT SUMMARY
PT NEW ADMIT THIS AM. AAOX4. NPO. DISCOMFORT DECREASED WITH 25mcg FENTANYL X1
THIS AM. NO NAUSEA/EMESIS. PT MOVES WELL IN BED, RESTING SINCE ADMISSION.
ORIENTED TO ROOM + CALL LIGHT USE. PT CURRENTLY RESTING IN BED WITH CALL LIGHT
IN REACH.

## 2020-11-13 NOTE — NUR
SUMMARY
PT SLEEPING WITH SNORING RESPIRATIONS FOR MOST OF SHIF. TOLERATING CARDIAC
DIET WITHOUT NAUSEA OR REPORTED INCREASE IN ABD PAIN. PT REPORTS PAIN EACH
TIME AWAKENED, RETURNS TO SLEEP WITHIN MINUTES OF BEING AWAKENED

## 2020-11-14 NOTE — NUR
A&OX3, DENIES ANY NAUSEA, C/O PAIN ON L SIDE THIS AM, MEDICATED WITH FENTANYL,
DENIES ANY OTHER DISCOMFORT AT THIS TIME, CONT. TO MONITOR FOR ANY CHANGES.

## 2020-11-14 NOTE — NUR
ARRIVED FROM CATH LAB VIA GURNEY, DROWSY, BUT AWAKENS EASILY, A&OX3,
PERCUTANEOUS DRAIN NOTED ON RUQ DRAINING DARK BROWNISH NEO DRAINAGE, C/O
SORENESS ON DRAIN AREA, LUNGS CLEAR, HR IRREG, ACTIVE BT'S X4, PT REQUESTING
ICE CHIPS, NO OTHER CHANGES THIS SHIFT.

## 2020-11-14 NOTE — NUR
PAIN:
PT REPORTING PAIN NOT RESOLVED BY CURRENT PAIN MED ORDERS. PT OFFERED
HEAT/COOL THERAPY, DECINED SUGGESTIONS. PT STATES FENTANYL NOT WORKING,
STATES PREVIOUSLY GIVEN MORPHINE W/BETTER RELIEF.PT REQUESTING TO CONTACT MD
FOR NEW ORDERS. CALL PLACED TO DR BLACKMAN, PT HX, SURG PROCEDURE, LABS AND
CURRENT MED ORDERS REV. NEW ORDERS FOR MORPHINE AND TORADOL AND TO D/C
FENTANYL.

## 2020-11-14 NOTE — NUR
SHIFT SUMMARY:
ACALCULOUS CHOLECYSTITIS
PT IS ALERT AND ORIENTED X4 WHILE AWAKE. HE CAN BE DROWSY AT TIMES WHEN
INITIALLY WOKEN UP FROM SLEEPING BUT IS EASILY AROUSABLE. HE HAS BEEN MAINLY
SLEEPING WITH SNORING MOST OF THE SHIFT. PAIN IS MANAGED WITH ONE NORCO. HE
HAS BEEN NPO SINCE MIDNIGHT. PT DOES HAVE TELE AND HAS BEEN SINUS TACH WITH
PVC'S. PT IS CURRENTLY LAYING IN BED WATCHING TV WITH CALL LIGHT WITHIN REACH.
THE PLAN IS TO WAIT FOR DR. TODD TO SEE WHAT HE CAN DO FOR HIM. ALSO
MANAGING PAIN.

## 2020-11-15 NOTE — NUR
SHIFT SUMMARY
POD 1 PERCUTANEOUS DRAIN PLACEMENT.
AA0X4, VSS. PT HAS BEEN SLEEPING MOST OF SHIFT. PAIN MANAGED PER EMAR WITH
MORPHINE AND TORODOL. NAUSEA MANAGED WITH ZOFRAN EARLY IN SHIFT. TOLERATED PO
WELL. ABLE TO EAT SOME OF DINNER AND SNACKS. DRINKING FLUIDS DURING SHIFT.
DRAIN PATENT WITH BROWN/YELLOW OUTPUT. DENIES ANY SOB. PT IND IN ROOM
AMBULATING TO RESTROOM. NO MEASUREMENT FOR URINE, PROVIDED EDUCATION AND ASKED
PATIENT TO USE URINAL THAT WAS IN RESTROOM.

## 2020-11-15 NOTE — NUR
REPORTS HAVING BETTER PAIN CONTROL THIS AM WITH TORADOL AND MORPHINE, DENIES
ANY NAUSEA, REPORTS TOLERATED FULL LIQUIDS WELL, DIET ADVANCED TO REGULAR THIS
AM, CONT. TO MONITOR FOR ANY CHANGES.

## 2020-11-15 NOTE — NUR
REPORTS TOLERATING REGULAR DIET WELL, ENCOURAGED TO AMBULATE BUT PT STAYED IN
ROOM ALL DAY, PERC. DRAIN HAD 22OCC OUTPUT THIS SHIFT, REPORTS HAVING ADEQUATE
PAIN CONTROL WITH MORPHINE, NO ACUTE CHANGES THIS SHIFT.

## 2020-11-16 NOTE — NUR
SHIFT SUMMARY
PLAN FOR PROCEDURE IN [facility] TOMORROW AFTERNOON. PT'S PAIN REASONABLY
MANAGED W/ PO & IV MEDS. DRAIN CONTINUES TO PUT OUT HIGH AMOUNT. PT IND IN
ROOM.

## 2020-11-16 NOTE — NUR
SHIFT SUMMARY
POD 2 PERCUTANEOUS DRAIN PLACEMENT
AA0X4, PT PAINFUL T/O SHIFT MEDICATED PER EMAR. ENCOURAGED PT TO AMBULATE TO
HELP PASS GAS, PT AMBULATING T/O HALLWAY INDEPENDENTLY. PT TOLERATING PO WELL.
PERCUTANEOUS DRAIN STILL HAVING BROWN DRAINAGE. PLAN IS TO POSSIBLY DC TODAY.

## 2020-11-17 NOTE — NUR
SHIFT SUMMARY:
 
PT A&O X4. VSS. ABD MODERATLY DISTENDED. PT REPORTS PASSING FLATUS AND HAVING
BM'S. URESIL DRAIN IN RUQ DRAINING A BROWN THICK LIQ. 250CC EMPTIED. PAIN
BEING MANAGED WITH NORCO AND MORPHINE PER EMAR. PT INDEPENDENT IN ROOM.
VOIDING AND DEYVI PO. PLAN FOR PT TO BE NPO LATER TODAY FOR CARDIAC PROCEDURE AT
4PM.

## 2020-11-17 NOTE — NUR
DISCHARGE
ESCORTED OUT VIA W/C. JUNI SCRIPT GIVEN AND ABX CALLED TO [facility]. PT STATES
UNDERSTANDING IMPORTANCE OF F/U APPOINTMENTS AND TAKING MEDICATIONS.

## 2020-12-02 NOTE — NUR
PT HAS BEEN MOSTLY PLEASANT TODYA. HAS ASKED FOR FOOD. OBTAINED DIET THIS AFT.
AND HAS HAD SOME JELLO AND DRINKS. EXPECTING DINNER SOON. DR TO DISCUSS PLAN
WITH DR TODD AND ADVISE WHAT STEPS WE WILL BE TAKING . BED INLOW POSITIOIN,
CALL LITE IN REACH, CALLS APROP

## 2020-12-02 NOTE — NUR
ASSUMED CARE. ZAID IS VERY SLEEPY STATES HE HAS NOT SLEPT VERY WELL SINCE HE
GOT THE TUBE PLACED. "CAN'T GET MUCH SLEEP WHEN YOUR IN PAIN ALL THE TIME".
PAIN IS TOLERABLE AT THIS TIME, DRAIN IS TO RUQ, BILE IN BULB. ABDOMIN NO
CHANGE ROUND DISTENDED, TENDER. NO NAUSEA AT THIS TIME. TOLERATING FOOD WELL.
IVF ALMOST COMPLETE. DENIES ANY OTHER NEEDS AT THIS TIME. CALL LIGHT IS IN
REACH.

## 2020-12-02 NOTE — NUR
SHIFT SUMMARY/ADMIT:
ZAID IS BEING ADMITTED TO THE FLOOR FOR ABDOMINAL PAIN AND ELEVATED LIVER
ENZYMES. HE HAS BEEN IN AND OUT OF THE ER THIS WEEK DUE TO THE ABDOMINAL PAIN.
IT ORGINALLY STATED WHEN HE GOT A GALLBADDER INFECTION 2 WEEKS AGO BUT DUE TO
HIS CARDIAC STATUS HE WAS NOT ABLE TO GO INTO [facility]. THEREFORE DR. TODD
PLACED A CONNOR DRAIN. HE ARRIVED TO THE FLOOR VIA WC AND IS ABLE TO TRANSFER SELF
TO BED. HE IS AOX3, VERY DROUSY. HE IS VERY PERSISENT ABOUT HAVING WATER EVEN
THOUGH ORDER STATES NPO. EVEN GOT RUDE WITH CNA AND I. HE HAS A HISTORY OF
TRANSAMINITIS. LUNG SOUNDS ARE CLEAR, HR IS SINUS ON TELE. VS WNL. REPORTS
PAIN 9/10. STARTED IVF AND FLAGYL ANTIBOTIC. GAVE 50MCQ OF FENTANYL FOR PAIN.
HE DID ASK IF I COULD CHANGE THE PAIN MED ALREADY AS HE STATES HE DOES NOT
LIKE IT AND PERFERS DILUDID. STATES HE FELL LAST TIME WHEN ON IT. BED ALARM
WAS PLACED JUST IN CASE. DRAIN TO RUQ IS TO CONNOR BULB THAT HE DRAINS HIMSELF.
HAS BILE DRAINAGE IN IT. DID NOT WANT DRESSING REMOVED STATES IT HURTS TO
MUCH. ABDOMIN DISTENDED, FIRM, GAURDED, BT ACTIVE. DENIES NAUSEA AT THIS TIME.
PATIENT IS NOW ASLEEP IN BED, CALL LIGHT IS IN REACH, BED ALARM IS ON. WILL
REPORT TO DAYSHIFT.

## 2020-12-03 NOTE — NUR
PT RESTING IN BED AFTER DINNER. PT HAS REPORTED PAIN FROM 7-10 ALL SHIFT WHICH
IS REGULATED VIA EMAR MEDICATIONS. PT LINE IS WNL AND SALINE LOCKED. PT
AMBULATED IN ROOM WITHOUT ASSIST AND WAS EDUCATED ON USING CALL LIGHT WHEN
HELP IS DESIRED. PT WAS CALM AND COOPERATIVE WITH THIS WRITER, HOWEVER
MULTIPLE REPOTS OF ANGER AND AGGITATION CAME THROUGH TODAY. PT DISCHARGE
ORDERS ARE IN AND [facility]. STAFF WILL CONT. TO MONITOR
FOR CHANGES.

## 2020-12-03 NOTE — NUR
PT CONTINUES TO BE PLEASANT AND RESPECTFUL.  NURSING ATTEMPTED TO GET HIM TO
VENT HIS FRUSTRATIONS.  HE DISCUSSED HIS FRUSTRATION WITH THE DAMAGE TO HIS
HEART AND HE STATED THAT HE FELT AS IF THE MEDICAL PROFESSIONALS ALLOWED HIM
TO PROGRESS WITH HIS HEART FAILURE WHEN THEY DENIED HIM A HEART CATH DUE TO
HIS METH USE.  HE STATES THAT IF HE HAD KNOW THAT HE NEEDED TO QUIT METH, THEN
HE WOULD HAVE.  BUT BECAUSE IT WAS NOT DISCUSSED WITH HIM, THE DAMAGE IS NOT
IRREVERSABLE.  HE STATED THAT [facility] WOULD [facility] TO BE MADE
COMFORTABLE FOR THE REST OF WHAT IS LEFT OF HIS LIFE.  ASKED FOR A POPCICLE
AND SOME JELLO.  NURSING PROVIDED REQUESTED ITEM, DENIES DURTHER NEEDS OR
WANTS AT THIS TIME.  SAFETY MEASURES IN PLACE.  WILL CONTINUE TO MONITOR.

## 2020-12-03 NOTE — NUR
ENTERED ROOM AFTER PT CALLED OUT FOR PAIN MEDS.  NURSING INFORMED PT WHEN NEXT
PAIN MEDS WERE AVAILABLE, REITERATING THE INTERVAL OF WHEN THEY ARE AVAILABLE.
 PT BECAME AGITATED AND BEGAN RAISING HIS VOICE TO NURSING.  NURSING AGAIN
REITERATED THE FREQUENCY IN WHICH THE MEDS WERE ORDER.  PT ACCUSED NURSING OF
CHANGING HIS MED ORDERS STATED, "EVERY TIME THERE IS A SHIFT CHANGE YOU
NURSES JUST CHANGE MY MEDS TO WHAT YOU WANT!  MY DR. TOLD ME MY MEDS WILL BE
EVERY 2 HOURS, NOW GIVE ME MY PAIN MEDS!"  HE THEN DEMANDED TO SPEAK WITH HIS
MD.  NURSING INFORMED HIM THAT HIS MD WAS NOT ON CALL, AND ANOTHER MD WOULD BE
CONTACTED.  PT BECAME EVEN MORE BELIGERANT, SLAMING HIS HAND ON THE OVER
BED TABLE AND STARTED RAISING HIS VOICE LOUDER.  NURSING THEN CALL OUT TO
CHARGE NURSE, KIMI CARRASQUILLO RN.  SHE ENTERED THE ROOM AND EXPLAINED TO THE PT
THE FREQUENCY INTERVAL THAT THE MD HAD ORDERED AND THAT NURSING IS UNABLE TO
CHANGE THE ORDER.  HE TGEN STATED THAT HE WOULD SEE NURSING WHEN MED WAS
AVAILABLE.  SAFETY MEASURES IN PLACE.  WILL CONTINUE TO MONITOR.

## 2020-12-03 NOTE — NUR
THIS WRITER RECEIVED CALL FROM CARE MANAGEMENT INFORMING ME THAT PT WILL NOT
BE RECIEVING DIALYSIS AND THAT PT IS WAITING FOR [facility].

## 2020-12-03 NOTE — NUR
Late Entry from previous note.
 
Spoke with Pt's mother nikky by phone per verbal permission from Pt. Provided
update and discussed plan for hospice. Answered questions and discussed
concerns. Nikky appears to be in agreement with plan. Deffered some questions
to Caremanager Wei. Nikky expresses appreciation of conversation.
 
Spoke with Caremansherry Wei and relayed mother's request for a phone call
today after 2:00 PM.
 
Palliative Care will remain available.

## 2020-12-03 NOTE — NUR
Pt resting in bed with his eyes closed upon arrival. Pt awakes to gentle
voice. Engaged in therapeutic discussion regarding goals of care.  Listened as
Pt discusses his current condition. Pt reports requesting hospice. Educated on
hospice philosophy with V/U made by Pt. Pt reports still wanting hospice. Pt's
breakfast arrived and this RN ended visit.
 
Palliative Care will remain available

## 2020-12-03 NOTE — NUR
PT RESPECTFULLY ASKED IF HE COULD HAVE A BEDTIME SNACK.  HE WAS VERY
RESPECTFUL AND PLEASANT AT THIS TIME.  SAFETY MEASURES IN PLACE.  WILL
CONTINUE TO MONITOR.

## 2020-12-03 NOTE — NUR
SHIFT SUMMARY: ZAID DID WELL LAST NIGHT. PAIN WAS WELL CONTROLLED WITH
FENTANYL 50MCQ Q 4. HE TENDS TO FALL ASLEEP AFTER THE PAIN MEDICATIONS. GOOD
APPETITE. HE IS SELF DRAINING THE CONNOR TUBE, BILE IS THE OUTPUT. ABDOMIN IS
ROUND DISTENDED, FIRM, BT ACTIVE. IVF COMPLETED. NO NAUSEA NOTED. INDEPENDENT
IN THE ROOM. NO OTHER CHANGES TO NOTE THIS SHIFT. PLAN: TRANSFER TO [facility] TO RECEIVE FURTHER IV ANTIBOTIC TREATMENT, PAIN MANAGMENT, MONITOR
AND ASSESS.

## 2020-12-04 NOTE — NUR
SHIFT SUMMARY
 
LYING IN SEMI FOWLERS WITH EYES OPEN WHILE WATCHING TV.  AAO X4, LAL, FOLLOWES
ALL COMMANDS.  HAS RESTED OFF AND ON.  NO FURTHER SIGNIFICANT ISSUES OR
CHANGES THIS SHIFT.  CONTINUES TO BE CONTINENT OF BOWEL AND BLADDER, USES
COMMODE.  DENIES FURTHER NEEDS OR WANTS AT THIS TIME.  SAFETY MEASURES IN
PLACE.  WILL CONTINUE TO MONITOR AND GIVE HAND OFF TO ONCOMING SHIFT USING
SBAR.

## 2020-12-04 NOTE — NUR
[facility] BE[facility]. PT REPORTS NO PAIN AT
THIS TIME. IV LINE DC'D AND WNL. PT EDUCATED ON PAIN MANAGMENT AND COMMUNITY
RECOURSES.